# Patient Record
Sex: MALE | Race: WHITE | Employment: FULL TIME | URBAN - METROPOLITAN AREA
[De-identification: names, ages, dates, MRNs, and addresses within clinical notes are randomized per-mention and may not be internally consistent; named-entity substitution may affect disease eponyms.]

---

## 2017-01-06 PROCEDURE — 82306 VITAMIN D 25 HYDROXY: CPT | Performed by: INTERNAL MEDICINE

## 2017-01-06 PROCEDURE — 85025 COMPLETE CBC W/AUTO DIFF WBC: CPT | Performed by: INTERNAL MEDICINE

## 2017-01-06 PROCEDURE — 84403 ASSAY OF TOTAL TESTOSTERONE: CPT | Performed by: INTERNAL MEDICINE

## 2017-01-06 PROCEDURE — 80061 LIPID PANEL: CPT | Performed by: INTERNAL MEDICINE

## 2017-01-06 PROCEDURE — 84153 ASSAY OF PSA TOTAL: CPT | Performed by: INTERNAL MEDICINE

## 2017-01-06 PROCEDURE — 84443 ASSAY THYROID STIM HORMONE: CPT | Performed by: INTERNAL MEDICINE

## 2017-01-06 PROCEDURE — 80053 COMPREHEN METABOLIC PANEL: CPT | Performed by: INTERNAL MEDICINE

## 2018-10-10 ENCOUNTER — LAB REQUISITION (OUTPATIENT)
Dept: LAB | Facility: HOSPITAL | Age: 43
End: 2018-10-10
Payer: COMMERCIAL

## 2018-10-10 DIAGNOSIS — Z01.89 ENCOUNTER FOR OTHER SPECIFIED SPECIAL EXAMINATIONS: ICD-10-CM

## 2018-10-10 PROCEDURE — 88304 TISSUE EXAM BY PATHOLOGIST: CPT | Performed by: SURGERY

## 2021-09-30 PROBLEM — M79.644 FINGER PAIN, RIGHT: Status: ACTIVE | Noted: 2021-09-30

## 2021-09-30 PROBLEM — S63.619A: Status: ACTIVE | Noted: 2021-09-30

## 2021-10-25 PROBLEM — S63.614A SPRAIN OF RIGHT RING FINGER: Status: ACTIVE | Noted: 2021-10-25

## 2021-10-25 PROBLEM — M20.021: Status: ACTIVE | Noted: 2021-10-25

## 2022-01-04 ENCOUNTER — HOSPITAL ENCOUNTER (OUTPATIENT)
Age: 47
Discharge: HOME | End: 2022-01-04
Payer: COMMERCIAL

## 2022-01-04 ENCOUNTER — APPOINTMENT (OUTPATIENT)
Dept: GENERAL RADIOLOGY | Age: 47
End: 2022-01-04
Attending: NURSE PRACTITIONER
Payer: COMMERCIAL

## 2022-01-04 VITALS
HEART RATE: 77 BPM | DIASTOLIC BLOOD PRESSURE: 83 MMHG | SYSTOLIC BLOOD PRESSURE: 144 MMHG | TEMPERATURE: 98 F | RESPIRATION RATE: 18 BRPM | BODY MASS INDEX: 27 KG/M2 | OXYGEN SATURATION: 98 % | WEIGHT: 220 LBS

## 2022-01-04 DIAGNOSIS — S62.514A CLOSED NONDISPLACED FRACTURE OF PROXIMAL PHALANX OF RIGHT THUMB, INITIAL ENCOUNTER: Primary | ICD-10-CM

## 2022-01-04 PROCEDURE — 29125 APPL SHORT ARM SPLINT STATIC: CPT

## 2022-01-04 PROCEDURE — 99203 OFFICE O/P NEW LOW 30 MIN: CPT

## 2022-01-04 PROCEDURE — 99204 OFFICE O/P NEW MOD 45 MIN: CPT

## 2022-01-04 PROCEDURE — 73140 X-RAY EXAM OF FINGER(S): CPT | Performed by: NURSE PRACTITIONER

## 2022-01-04 NOTE — ED INITIAL ASSESSMENT (HPI)
C/o right thumb pain for 24 hours. Finger swollen and bruised. Got caught to sliding door yesterday.

## 2022-01-04 NOTE — ED PROVIDER NOTES
Patient Seen in: [facility]      History   Patient presents with:  Finger Pain    Stated Complaint: right thumb pain    Subjective:   44-year-old male complaining of pain to the right thumb region.   States that he had the accidentally jam Musculoskeletal:      Comments: Swelling and tenderness to the distal right thumb region. Along with some swelling. Limited range of motion with the DIP joint. Otherwise circulation and sensation intact. Distal pulses intact.    Skin:     General: [id number]  [phone]    Schedule an appointment as soon as possible for a visit in 1 day  Orthopedist referral          Medications Prescribed:  Current Discharge Medication List

## 2022-12-20 ENCOUNTER — HOSPITAL ENCOUNTER (OUTPATIENT)
Dept: ULTRASOUND IMAGING | Age: 47
Discharge: HOME | End: 2022-12-20
Attending: INTERNAL MEDICINE
Payer: COMMERCIAL

## 2022-12-20 DIAGNOSIS — N50.812 PAIN IN BOTH TESTICLES: ICD-10-CM

## 2022-12-20 DIAGNOSIS — N50.811 PAIN IN BOTH TESTICLES: ICD-10-CM

## 2022-12-20 PROCEDURE — 76870 US EXAM SCROTUM: CPT | Performed by: INTERNAL MEDICINE

## 2022-12-20 PROCEDURE — 93975 VASCULAR STUDY: CPT | Performed by: INTERNAL MEDICINE

## 2024-04-18 ENCOUNTER — HOSPITAL ENCOUNTER (OUTPATIENT)
Dept: ULTRASOUND IMAGING | Age: 49
Discharge: HOME | End: 2024-04-18
Attending: INTERNAL MEDICINE
Payer: COMMERCIAL

## 2024-04-18 DIAGNOSIS — N50.812 TESTICULAR PAIN, LEFT: ICD-10-CM

## 2024-04-18 PROCEDURE — 93975 VASCULAR STUDY: CPT | Performed by: INTERNAL MEDICINE

## 2024-04-18 PROCEDURE — 76870 US EXAM SCROTUM: CPT | Performed by: INTERNAL MEDICINE

## 2024-06-12 ENCOUNTER — OFFICE VISIT (OUTPATIENT)
Dept: SURGERY | Facility: CLINIC | Age: 49
End: 2024-06-12

## 2024-06-12 VITALS
HEIGHT: 75 IN | DIASTOLIC BLOOD PRESSURE: 86 MMHG | BODY MASS INDEX: 28.6 KG/M2 | SYSTOLIC BLOOD PRESSURE: 133 MMHG | HEART RATE: 67 BPM | WEIGHT: 230 LBS

## 2024-06-12 DIAGNOSIS — I86.1 BILATERAL VARICOCELES: Primary | ICD-10-CM

## 2024-06-12 PROCEDURE — 3075F SYST BP GE 130 - 139MM HG: CPT | Performed by: UROLOGY

## 2024-06-12 PROCEDURE — 3008F BODY MASS INDEX DOCD: CPT | Performed by: UROLOGY

## 2024-06-12 PROCEDURE — 99244 OFF/OP CNSLTJ NEW/EST MOD 40: CPT | Performed by: UROLOGY

## 2024-06-12 PROCEDURE — 3079F DIAST BP 80-89 MM HG: CPT | Performed by: UROLOGY

## 2024-06-12 NOTE — PROGRESS NOTES
[facility]  Initial Office Consultation    HPI:   Ludwin Odom is a 48 year old male here today for consultation at the request of, and a copy of this note will be sent to, GAIL CRAIG MD.    1.  Bilateral varicoceles  Patient seen by his PCP 3/26/2024 for evaluation of a dull scrotal pain as well as a lump that he felt in his left hemiscrotum.  The pain is more of an ache and is mild.  He reports the lump is more prominent when he is aroused.    Had a scrotal ultrasound completed 4/18/2024 which revealed:    US SCROTUM W/ DOPPLER (CPT=93975/86887)    Result Date: 4/18/2024  CONCLUSION:  1. Bilateral varicoceles.  The left varicocele corresponds to the area indicated as a palpable lump by the patient. 2. Mild bilateral epididymal hyperemia, correlate clinically for epididymitis.   LOCATION:  [place]    Dictated by (CST): Ky Ty MD on 4/18/2024 at 1:01 PM     Finalized by (CST): Ky Ty MD on 4/18/2024 at 1:03 PM        He was given a 14-day course of Bactrim DS which she completed.  No significant change in symptoms noted.    He denies gross hematuria or dysuria.  No urethral discharge.  No fevers or chills or testicular swelling.      PAST MEDICAL HISTORY: Hyperlipidemia    PAST SURGICAL HISTORY: Vasectomy.  Hemorrhoid surgery.    SOCIAL HISTORY:  and has 3 children.  No smoking or illicit drug use.  No alcohol.  Works in sales.     Family History   Family history unknown: Yes     Allergies: Patient has no known allergies.      REVIEW OF SYSTEMS:  Pertinent positives and negatives per HPI. A 12-point ROS was performed and is otherwise negative.       EXAM:  /86 (BP Location: Right arm, Patient Position: Sitting, Cuff Size: adult)   Pulse 67   Ht 6' 3\" (1.905 m)   Wt 230 lb (104.3 kg)   BMI 28.75 kg/m²     Physical Exam  Constitutional:       General: He is not in acute distress.     Appearance: He is well-developed.   HENT:      Head: Normocephalic.    Eyes:      General: No scleral icterus.  Cardiovascular:      Rate and Rhythm: Normal rate.   Pulmonary:      Effort: Pulmonary effort is normal.   Abdominal:      General: There is no distension.      Palpations: Abdomen is soft.      Tenderness: There is no abdominal tenderness.   Genitourinary:     Penis: Circumcised. No tenderness or discharge.       Testes:         Right: Mass, tenderness, swelling, testicular hydrocele or varicocele not present.         Left: Mass, tenderness, swelling, testicular hydrocele or varicocele not present.      Comments: No clinically palpable varicoceles with Valsalva.  Skin:     General: Skin is warm and dry.   Neurological:      Mental Status: He is alert and oriented to person, place, and time.   Psychiatric:         Mood and Affect: Mood normal.         Behavior: Behavior normal.       LABS:  No results found.      IMAGING:  See HPI.      IMPRESSION & PLAN:  48 year old male with bilateral subclinical varicoceles.      Minimally symptomatic.  No testicular size discrepancy on ultrasound.  No concerns for subfertility.    Findings reviewed with patient in detail.  We discussed relevant anatomy and physiology.  We discussed that the varicocele is a group of dilated veins in the pampiniform plexus and that it is more common on the left side.     We discussed possible clinical manifestations of a varicocele including inguinal, scrotal, or testicular pain, testicular atrophy, as well as subfertility/infertility.     We discussed indications for considering varicocele repair including presence of bothersome symptoms, a palpable varicocele and abnormal semen analysis, or testicular size discrepancy/atrophy.     Other than observation, we discussed definitive management options including open varicocelectomy, laparoscopic varicocelectomy, and percutaneous embolization.  We discussed rationale, approach.     We discussed risks including but not limited to anesthetic complications,  infection, bleeding, hematoma formation, hydrocele formation, varicocele recurrence, testicular atrophy, injury to surrounding organs or structures including the vas deferens and testicular artery.     Patient verbalized understanding.  He wishes to manage this conservatively.  He will follow-up with us as needed if symptoms worsen.    All questions answered.      Manish Broussard MD  6/12/2024

## 2025-05-12 ENCOUNTER — HOSPITAL ENCOUNTER (OUTPATIENT)
Dept: ULTRASOUND IMAGING | Age: 50
Discharge: HOME | End: 2025-05-12
Payer: COMMERCIAL

## 2025-05-12 DIAGNOSIS — N50.819 PERSISTENT TESTICULAR PAIN: ICD-10-CM

## 2025-05-12 PROCEDURE — 93975 VASCULAR STUDY: CPT | Performed by: NURSE PRACTITIONER

## 2025-05-12 PROCEDURE — 76870 US EXAM SCROTUM: CPT | Performed by: NURSE PRACTITIONER
